# Patient Record
Sex: MALE | Race: OTHER | HISPANIC OR LATINO | Employment: OTHER | URBAN - METROPOLITAN AREA
[De-identification: names, ages, dates, MRNs, and addresses within clinical notes are randomized per-mention and may not be internally consistent; named-entity substitution may affect disease eponyms.]

---

## 2024-02-12 ENCOUNTER — HOSPITAL ENCOUNTER (EMERGENCY)
Facility: HOSPITAL | Age: 39
Discharge: HOME | End: 2024-02-12
Attending: EMERGENCY MEDICINE

## 2024-02-12 VITALS
TEMPERATURE: 98 F | HEART RATE: 54 BPM | HEIGHT: 71 IN | WEIGHT: 170 LBS | SYSTOLIC BLOOD PRESSURE: 145 MMHG | DIASTOLIC BLOOD PRESSURE: 86 MMHG | RESPIRATION RATE: 22 BRPM | OXYGEN SATURATION: 100 % | BODY MASS INDEX: 23.8 KG/M2

## 2024-02-12 DIAGNOSIS — J18.9 PNEUMONIA OF RIGHT LOWER LOBE DUE TO INFECTIOUS ORGANISM: Primary | ICD-10-CM

## 2024-02-12 DIAGNOSIS — R07.9 CHEST PAIN: ICD-10-CM

## 2024-02-12 LAB
ALBUMIN SERPL BCP-MCNC: 4.4 G/DL (ref 3.5–5.2)
ALP SERPL-CCNC: 71 U/L (ref 55–135)
ALT SERPL W/O P-5'-P-CCNC: 12 U/L (ref 10–44)
ANION GAP SERPL CALC-SCNC: 8 MMOL/L (ref 8–16)
AST SERPL-CCNC: 18 U/L (ref 10–40)
BASOPHILS # BLD AUTO: 0.04 K/UL (ref 0–0.2)
BASOPHILS NFR BLD: 0.6 % (ref 0–1.9)
BILIRUB SERPL-MCNC: 0.4 MG/DL (ref 0.1–1)
BNP SERPL-MCNC: <10 PG/ML (ref 0–99)
BUN SERPL-MCNC: 14 MG/DL (ref 6–20)
CALCIUM SERPL-MCNC: 8.9 MG/DL (ref 8.7–10.5)
CHLORIDE SERPL-SCNC: 106 MMOL/L (ref 95–110)
CO2 SERPL-SCNC: 25 MMOL/L (ref 23–29)
CREAT SERPL-MCNC: 1 MG/DL (ref 0.5–1.4)
CTP QC/QA: YES
CTP QC/QA: YES
DIFFERENTIAL METHOD BLD: ABNORMAL
EOSINOPHIL # BLD AUTO: 0.1 K/UL (ref 0–0.5)
EOSINOPHIL NFR BLD: 2 % (ref 0–8)
ERYTHROCYTE [DISTWIDTH] IN BLOOD BY AUTOMATED COUNT: 12.4 % (ref 11.5–14.5)
EST. GFR  (NO RACE VARIABLE): >60 ML/MIN/1.73 M^2
GLUCOSE SERPL-MCNC: 100 MG/DL (ref 70–110)
HCT VFR BLD AUTO: 43.9 % (ref 40–54)
HGB BLD-MCNC: 14.5 G/DL (ref 14–18)
IMM GRANULOCYTES # BLD AUTO: 0.01 K/UL (ref 0–0.04)
IMM GRANULOCYTES NFR BLD AUTO: 0.2 % (ref 0–0.5)
LYMPHOCYTES # BLD AUTO: 2.9 K/UL (ref 1–4.8)
LYMPHOCYTES NFR BLD: 43.7 % (ref 18–48)
MCH RBC QN AUTO: 30.8 PG (ref 27–31)
MCHC RBC AUTO-ENTMCNC: 33 G/DL (ref 32–36)
MCV RBC AUTO: 93 FL (ref 82–98)
MONOCYTES # BLD AUTO: 0.7 K/UL (ref 0.3–1)
MONOCYTES NFR BLD: 10.4 % (ref 4–15)
NEUTROPHILS # BLD AUTO: 2.9 K/UL (ref 1.8–7.7)
NEUTROPHILS NFR BLD: 43.1 % (ref 38–73)
NRBC BLD-RTO: 0 /100 WBC
PLATELET # BLD AUTO: 151 K/UL (ref 150–450)
PMV BLD AUTO: 13 FL (ref 9.2–12.9)
POC MOLECULAR INFLUENZA A AGN: NEGATIVE
POC MOLECULAR INFLUENZA B AGN: NEGATIVE
POTASSIUM SERPL-SCNC: 3.8 MMOL/L (ref 3.5–5.1)
PROT SERPL-MCNC: 7.7 G/DL (ref 6–8.4)
RBC # BLD AUTO: 4.71 M/UL (ref 4.6–6.2)
SARS-COV-2 RDRP RESP QL NAA+PROBE: NEGATIVE
SODIUM SERPL-SCNC: 139 MMOL/L (ref 136–145)
TROPONIN I SERPL DL<=0.01 NG/ML-MCNC: <0.006 NG/ML (ref 0–0.03)
WBC # BLD AUTO: 6.61 K/UL (ref 3.9–12.7)

## 2024-02-12 PROCEDURE — 99285 EMERGENCY DEPT VISIT HI MDM: CPT | Mod: 25

## 2024-02-12 PROCEDURE — 83880 ASSAY OF NATRIURETIC PEPTIDE: CPT | Performed by: PHYSICIAN ASSISTANT

## 2024-02-12 PROCEDURE — 85025 COMPLETE CBC W/AUTO DIFF WBC: CPT | Performed by: PHYSICIAN ASSISTANT

## 2024-02-12 PROCEDURE — 87502 INFLUENZA DNA AMP PROBE: CPT

## 2024-02-12 PROCEDURE — 84484 ASSAY OF TROPONIN QUANT: CPT | Performed by: PHYSICIAN ASSISTANT

## 2024-02-12 PROCEDURE — 87635 SARS-COV-2 COVID-19 AMP PRB: CPT | Performed by: PHYSICIAN ASSISTANT

## 2024-02-12 PROCEDURE — 80053 COMPREHEN METABOLIC PANEL: CPT | Performed by: PHYSICIAN ASSISTANT

## 2024-02-12 PROCEDURE — 25000003 PHARM REV CODE 250: Performed by: EMERGENCY MEDICINE

## 2024-02-12 PROCEDURE — 93005 ELECTROCARDIOGRAM TRACING: CPT

## 2024-02-12 PROCEDURE — 93010 ELECTROCARDIOGRAM REPORT: CPT | Mod: ,,, | Performed by: INTERNAL MEDICINE

## 2024-02-12 RX ORDER — AMOXICILLIN 500 MG/1
500 CAPSULE ORAL EVERY 12 HOURS
Qty: 14 CAPSULE | Refills: 0 | Status: SHIPPED | OUTPATIENT
Start: 2024-02-12 | End: 2024-02-19

## 2024-02-12 RX ORDER — IBUPROFEN 400 MG/1
800 TABLET ORAL
Status: COMPLETED | OUTPATIENT
Start: 2024-02-12 | End: 2024-02-12

## 2024-02-12 RX ORDER — AMOXICILLIN AND CLAVULANATE POTASSIUM 875; 125 MG/1; MG/1
1 TABLET, FILM COATED ORAL
Status: COMPLETED | OUTPATIENT
Start: 2024-02-12 | End: 2024-02-12

## 2024-02-12 RX ORDER — IBUPROFEN 800 MG/1
800 TABLET ORAL EVERY 6 HOURS PRN
Qty: 40 TABLET | Refills: 0 | Status: SHIPPED | OUTPATIENT
Start: 2024-02-12

## 2024-02-12 RX ADMIN — AMOXICILLIN AND CLAVULANATE POTASSIUM 1 TABLET: 875; 125 TABLET, FILM COATED ORAL at 09:02

## 2024-02-12 RX ADMIN — IBUPROFEN 800 MG: 400 TABLET ORAL at 09:02

## 2024-02-13 NOTE — DISCHARGE INSTRUCTIONS
Recommend ibuprofen as needed for pain control.    Seek medical care if your symptoms are worsening or any concerns.    1. Recomiende ibuprofeno según sea necesario para controlar el dolor.  2. Busque atención médica si elder síntomas empeoran o si tiene alguna inquietud.

## 2024-02-13 NOTE — ED PROVIDER NOTES
Encounter Date: 2/12/2024       History     Chief Complaint   Patient presents with    Chest Pain     Right side chest pain x 6 days with intermittent SOB x 3 days.      38-year-old male presents with right-sided chest pain.  No known health issues.  Patient states no tobacco use history.  Denies any alcohol use.  Denies any illicit drug use.  Symptom onset 6 days prior.  Patient reports constant right-sided chest pain that is intermittently worse.  Patient reports is now radiating to the right middle back.  Denies any pleuritic component.  Denies any shortness of breath.      Review of patient's allergies indicates:  No Known Allergies  No past medical history on file.  No past surgical history on file.  No family history on file.     Review of Systems   Constitutional:  Negative for chills, fatigue and fever.   HENT:  Negative for congestion and sore throat.    Respiratory:  Negative for cough, chest tightness and shortness of breath.    Cardiovascular:  Positive for chest pain. Negative for palpitations and leg swelling.   Gastrointestinal:  Negative for abdominal pain, diarrhea, nausea and vomiting.   Musculoskeletal:  Negative for back pain.   Skin:  Negative for rash.   Neurological:  Negative for dizziness, light-headedness and headaches.       Physical Exam     Initial Vitals [02/12/24 1720]   BP Pulse Resp Temp SpO2   (!) 142/88 61 16 98.1 °F (36.7 °C) 100 %      MAP       --         Physical Exam    Nursing note and vitals reviewed.  Constitutional: He appears well-developed. He is not diaphoretic. No distress.   Conversational sitting upright in chair.   HENT:   Head: Normocephalic.   Eyes: EOM are normal.   Neck: No JVD present.   Cardiovascular:  Normal rate and regular rhythm.           No murmur heard.  Pulmonary/Chest: Effort normal and breath sounds normal. He has no wheezes.   Abdominal: Abdomen is soft. He exhibits no distension. There is no abdominal tenderness.   Musculoskeletal:          General: Normal range of motion.      Comments: No lower extremity edema.  No calf tenderness     Neurological: He is alert.   Skin: Skin is warm.         ED Course   Procedures  Labs Reviewed   CBC W/ AUTO DIFFERENTIAL - Abnormal; Notable for the following components:       Result Value    MPV 13.0 (*)     All other components within normal limits   SARS-COV-2 RDRP GENE - Normal   POCT INFLUENZA A/B MOLECULAR - Normal   COMPREHENSIVE METABOLIC PANEL   TROPONIN I   B-TYPE NATRIURETIC PEPTIDE        ECG Results              EKG 12-LEAD (Final result)  Result time 02/13/24 10:38:22      Final result by Unknown User (02/13/24 10:38:22)                                      Imaging Results              X-Ray Chest PA And Lateral (Final result)  Result time 02/12/24 17:45:43      Final result by Ruben Taylor MD (02/12/24 17:45:43)                   Impression:      Focal airspace opacity in the right mid lung zone which could reflect infectious or inflammatory process, possible developing pneumonia in the right clinical setting.  Future radiographic follow-up is recommended to ensure resolution.      Electronically signed by: Ruben Taylor MD  Date:    02/12/2024  Time:    17:45               Narrative:    EXAMINATION:  XR CHEST PA AND LATERAL    CLINICAL HISTORY:  Shortness of breath    TECHNIQUE:  PA and lateral views of the chest were performed.    COMPARISON:  None    FINDINGS:  Cardiac silhouette is normal in size.  Lungs are symmetrically expanded.  Focal airspace opacity is seen in the right mid lung zone.  No confluent consolidation seen.  Lungs are otherwise clear.  No pneumothorax or significant pleural effusion..  No acute osseous abnormality identified.                                       Medications   ibuprofen tablet 800 mg (800 mg Oral Given 2/12/24 2110)   amoxicillin-clavulanate 875-125mg per tablet 1 tablet (1 tablet Oral Given 2/12/24 2110)     Medical Decision Making    38-year-old male  presenting with right-sided chest pain radiating into the right middle back.  He denies any pleuritic component, dyspnea, cough, nasal congestion.  The patient denies any history of blood clots.  Denies any surgeries within the last 4 months.  PERC negative.  X-ray shows possible right middle lobe infiltrate.  Patient non ill-appearing.  Patient's location of pain does correlate to the right lung infiltrate.  Patient was started on amoxicillin.  Patient reported response to ibuprofen.  Patient is to follow up with the primary care provider for re-evaluation within the next week.  Discussed return precautions for any worsening symptoms.      Differential diagnosis includes ACS, pulmonary edema, lower respiratory tract infection    Amount and/or Complexity of Data Reviewed  ECG/medicine tests:  Decision-making details documented in ED Course.    Risk  Prescription drug management.               ED Course as of 02/13/24 1838   Mon Feb 12, 2024 2003 EKG 12-lead  Time 5:18 p.m.     Rate 65, sinus, regular rhythm, normal axis.   QRS 92 QTC of 422.  No ST elevation or depression.  No T-wave inversion or hyperacute T-waves.  No Q-waves present.  PVC present.      Normal sinus rhythm with single PVC.   [JM]      ED Course User Index  [JM] Lv Jacinto MD                           Clinical Impression:  Final diagnoses:  [R07.9] Chest pain  [J18.9] Pneumonia of right lower lobe due to infectious organism (Primary)          ED Disposition Condition    Discharge Stable          ED Prescriptions       Medication Sig Dispense Start Date End Date Auth. Provider    amoxicillin (AMOXIL) 500 MG capsule Take 1 capsule (500 mg total) by mouth every 12 (twelve) hours. for 7 days 14 capsule 2/12/2024 2/19/2024 Lv Jacinto MD    ibuprofen (ADVIL,MOTRIN) 800 MG tablet Take 1 tablet (800 mg total) by mouth every 6 (six) hours as needed for Pain. 40 tablet 2/12/2024 -- Lv Jacinto MD          Follow-up Information        Follow up With Specialties Details Why Contact Info    [facility]  Schedule an appointment as soon as possible for a visit in 1 week Primary care [address] [facility] [id number] [phone]    [facility] - Emergency Dept Emergency Medicine  If symptoms worsen [address] [facility] [place] [id number] [phone]             Lv Jacinto MD  02/13/24 4341

## 2024-02-14 LAB
OHS QRS DURATION: 92 MS
OHS QTC CALCULATION: 422 MS

## 2024-02-23 ENCOUNTER — HOSPITAL ENCOUNTER (EMERGENCY)
Facility: HOSPITAL | Age: 39
Discharge: HOME | End: 2024-02-23
Attending: INTERNAL MEDICINE

## 2024-02-23 VITALS
DIASTOLIC BLOOD PRESSURE: 86 MMHG | SYSTOLIC BLOOD PRESSURE: 136 MMHG | HEART RATE: 66 BPM | WEIGHT: 155 LBS | TEMPERATURE: 98 F | BODY MASS INDEX: 24.33 KG/M2 | HEIGHT: 67 IN | RESPIRATION RATE: 18 BRPM | OXYGEN SATURATION: 100 %

## 2024-02-23 DIAGNOSIS — J98.4 LUNG ABNORMALITY: Primary | ICD-10-CM

## 2024-02-23 DIAGNOSIS — R07.9 CHEST PAIN: ICD-10-CM

## 2024-02-23 LAB
ALBUMIN SERPL BCP-MCNC: 4.5 G/DL (ref 3.5–5.2)
ALP SERPL-CCNC: 81 U/L (ref 55–135)
ALT SERPL W/O P-5'-P-CCNC: 20 U/L (ref 10–44)
ANION GAP SERPL CALC-SCNC: 9 MMOL/L (ref 8–16)
AST SERPL-CCNC: 23 U/L (ref 10–40)
BASOPHILS # BLD AUTO: 0.08 K/UL (ref 0–0.2)
BASOPHILS NFR BLD: 1 % (ref 0–1.9)
BILIRUB SERPL-MCNC: 0.4 MG/DL (ref 0.1–1)
BILIRUB UR QL STRIP: NEGATIVE
BNP SERPL-MCNC: <10 PG/ML (ref 0–99)
BUN SERPL-MCNC: 13 MG/DL (ref 6–20)
CALCIUM SERPL-MCNC: 9.4 MG/DL (ref 8.7–10.5)
CHLORIDE SERPL-SCNC: 105 MMOL/L (ref 95–110)
CLARITY UR: CLEAR
CO2 SERPL-SCNC: 24 MMOL/L (ref 23–29)
COLOR UR: YELLOW
CREAT SERPL-MCNC: 1.1 MG/DL (ref 0.5–1.4)
DIFFERENTIAL METHOD BLD: ABNORMAL
EOSINOPHIL # BLD AUTO: 0.2 K/UL (ref 0–0.5)
EOSINOPHIL NFR BLD: 2.9 % (ref 0–8)
ERYTHROCYTE [DISTWIDTH] IN BLOOD BY AUTOMATED COUNT: 12.9 % (ref 11.5–14.5)
EST. GFR  (NO RACE VARIABLE): >60 ML/MIN/1.73 M^2
GLUCOSE SERPL-MCNC: 92 MG/DL (ref 70–110)
GLUCOSE UR QL STRIP: NEGATIVE
HCT VFR BLD AUTO: 46.4 % (ref 40–54)
HGB BLD-MCNC: 15.6 G/DL (ref 14–18)
HGB UR QL STRIP: ABNORMAL
IMM GRANULOCYTES # BLD AUTO: 0.02 K/UL (ref 0–0.04)
IMM GRANULOCYTES NFR BLD AUTO: 0.3 % (ref 0–0.5)
KETONES UR QL STRIP: NEGATIVE
LACTATE SERPL-SCNC: 0.7 MMOL/L (ref 0.5–2.2)
LEUKOCYTE ESTERASE UR QL STRIP: NEGATIVE
LYMPHOCYTES # BLD AUTO: 3.3 K/UL (ref 1–4.8)
LYMPHOCYTES NFR BLD: 42.6 % (ref 18–48)
MCH RBC QN AUTO: 31.1 PG (ref 27–31)
MCHC RBC AUTO-ENTMCNC: 33.6 G/DL (ref 32–36)
MCV RBC AUTO: 93 FL (ref 82–98)
MONOCYTES # BLD AUTO: 0.7 K/UL (ref 0.3–1)
MONOCYTES NFR BLD: 8.7 % (ref 4–15)
NEUTROPHILS # BLD AUTO: 3.4 K/UL (ref 1.8–7.7)
NEUTROPHILS NFR BLD: 44.5 % (ref 38–73)
NITRITE UR QL STRIP: NEGATIVE
NRBC BLD-RTO: 0 /100 WBC
PH UR STRIP: 6 [PH] (ref 5–8)
PLATELET # BLD AUTO: 150 K/UL (ref 150–450)
PMV BLD AUTO: 13.3 FL (ref 9.2–12.9)
POTASSIUM SERPL-SCNC: 4.1 MMOL/L (ref 3.5–5.1)
PROCALCITONIN SERPL IA-MCNC: 0.03 NG/ML
PROT SERPL-MCNC: 8.2 G/DL (ref 6–8.4)
PROT UR QL STRIP: NEGATIVE
RBC # BLD AUTO: 5.01 M/UL (ref 4.6–6.2)
SODIUM SERPL-SCNC: 138 MMOL/L (ref 136–145)
SP GR UR STRIP: 1.02 (ref 1–1.03)
TROPONIN I SERPL DL<=0.01 NG/ML-MCNC: 0.01 NG/ML (ref 0–0.03)
URN SPEC COLLECT METH UR: ABNORMAL
UROBILINOGEN UR STRIP-ACNC: NEGATIVE EU/DL
WBC # BLD AUTO: 7.7 K/UL (ref 3.9–12.7)

## 2024-02-23 PROCEDURE — 83880 ASSAY OF NATRIURETIC PEPTIDE: CPT | Performed by: NURSE PRACTITIONER

## 2024-02-23 PROCEDURE — 96374 THER/PROPH/DIAG INJ IV PUSH: CPT

## 2024-02-23 PROCEDURE — 83605 ASSAY OF LACTIC ACID: CPT | Performed by: NURSE PRACTITIONER

## 2024-02-23 PROCEDURE — 93005 ELECTROCARDIOGRAM TRACING: CPT

## 2024-02-23 PROCEDURE — 63600175 PHARM REV CODE 636 W HCPCS: Performed by: INTERNAL MEDICINE

## 2024-02-23 PROCEDURE — 80053 COMPREHEN METABOLIC PANEL: CPT | Performed by: NURSE PRACTITIONER

## 2024-02-23 PROCEDURE — 81003 URINALYSIS AUTO W/O SCOPE: CPT | Performed by: NURSE PRACTITIONER

## 2024-02-23 PROCEDURE — 85025 COMPLETE CBC W/AUTO DIFF WBC: CPT | Performed by: NURSE PRACTITIONER

## 2024-02-23 PROCEDURE — 87040 BLOOD CULTURE FOR BACTERIA: CPT | Performed by: NURSE PRACTITIONER

## 2024-02-23 PROCEDURE — 99285 EMERGENCY DEPT VISIT HI MDM: CPT | Mod: 25

## 2024-02-23 PROCEDURE — 84145 PROCALCITONIN (PCT): CPT | Performed by: NURSE PRACTITIONER

## 2024-02-23 PROCEDURE — 93010 ELECTROCARDIOGRAM REPORT: CPT | Mod: ,,, | Performed by: INTERNAL MEDICINE

## 2024-02-23 PROCEDURE — 84484 ASSAY OF TROPONIN QUANT: CPT | Performed by: NURSE PRACTITIONER

## 2024-02-23 RX ORDER — KETOROLAC TROMETHAMINE 30 MG/ML
30 INJECTION, SOLUTION INTRAMUSCULAR; INTRAVENOUS
Status: COMPLETED | OUTPATIENT
Start: 2024-02-23 | End: 2024-02-23

## 2024-02-23 RX ORDER — HYDROCODONE BITARTRATE AND ACETAMINOPHEN 5; 325 MG/1; MG/1
1 TABLET ORAL EVERY 6 HOURS PRN
Qty: 10 TABLET | Refills: 0 | Status: SHIPPED | OUTPATIENT
Start: 2024-02-23

## 2024-02-23 RX ADMIN — KETOROLAC TROMETHAMINE 30 MG: 30 INJECTION, SOLUTION INTRAMUSCULAR; INTRAVENOUS at 10:02

## 2024-02-23 NOTE — FIRST PROVIDER EVALUATION
"Medical screening examination initiated.  I have conducted a focused provider triage encounter, findings are as follows:    Brief history of present illness:  Seen last week for CP, diagnosed with pneumonia; completed Amoxicillin and states pain continues    Vitals:    02/23/24 1729   BP: (!) 139/91   BP Location: Right arm   Patient Position: Sitting   Pulse: 68   Resp: 20   Temp: 97.8 °F (36.6 °C)   TempSrc: Oral   SpO2: 100%   Weight: 70.3 kg (155 lb)   Height: 5' 7" (1.702 m)       Pertinent physical exam:  NAD    Brief workup plan:  labs, CXR    Preliminary workup initiated; this workup will be continued and followed by the physician or advanced practice provider that is assigned to the patient when roomed.  "

## 2024-02-24 NOTE — ED TRIAGE NOTES
Pt presents to ED for the evaluation of right sided chest pain ongoing for 4 days. Patient was seen in the Ed for similar symptoms 15 days ago. Pt reports being diagnosed with pneumonia, states pain keeps coming back and has worsened this time. Denies other symptoms

## 2024-02-24 NOTE — DISCHARGE INSTRUCTIONS
seguimiento con la [facility] según lo programado y con norris médico de atención primaria dentro de la próxima semana para glenny reevaluación.

## 2024-02-24 NOTE — ED PROVIDER NOTES
"Encounter Date: 2/23/2024    SCRIBE #1 NOTE: I, Rikki Barcenas, am scribing for, and in the presence of,  Jose Rico MD.       History     Chief Complaint   Patient presents with    Pneumonia     Pt recently diagnosed with Pneumonia and completed dose of abx. Pt back to ER with reports "I'm not feeling any better". Pt denies SOB, weakness. + mid back discomfort " same as before".     Jack Wright is a 38 y.o. male with no known PMHx, who presents to the ED for evaluation of right sided chest pain for 4 days. Patient reports she was seen in the ED approximately 15 days ago for similar complaints, noting he was prescribed some medication and was diagnosed with pneumonia. He states he has been taking the prescribed medication with mild temporary relief noted, noting pain returns usually after 4-5 hours. He reports his pain has worsened in the past 4 days, noting his pain now radiates to his right shoulder/right upper back. He endorses last taking his medications this morning. No other medications taken PTA. No alleviating or exacerbating factors noted. Denies dyspnea, N/V, fever, chills, or other associated symptoms. Denies EtOH, tobacco, or other illicit drug usage. NKDA.     The history is provided by the patient. No  was used.     Review of patient's allergies indicates:  No Known Allergies  No past medical history on file.  No past surgical history on file.  No family history on file.     Review of Systems   Constitutional:  Negative for fever.   HENT:  Negative for sore throat.    Respiratory:  Negative for shortness of breath.    Cardiovascular:  Positive for chest pain.   Gastrointestinal:  Negative for diarrhea, nausea and vomiting.   Genitourinary:  Negative for dysuria.   Musculoskeletal:  Positive for arthralgias. Negative for back pain.   Skin:  Negative for rash.   Neurological:  Negative for weakness and headaches.   Psychiatric/Behavioral:  Negative for behavioral " problems.    All other systems reviewed and are negative.      Physical Exam     Initial Vitals [02/23/24 1729]   BP Pulse Resp Temp SpO2   (!) 139/91 68 20 97.8 °F (36.6 °C) 100 %      MAP       --         Physical Exam    Nursing note and vitals reviewed.  Constitutional: He appears well-developed and well-nourished.   HENT:   Head: Normocephalic and atraumatic.   Eyes: Conjunctivae are normal.   Neck: Neck supple.   Normal range of motion.  Cardiovascular:  Normal rate, regular rhythm and normal heart sounds.     Exam reveals no gallop and no friction rub.       No murmur heard.  Pulmonary/Chest: Breath sounds normal. No respiratory distress. He has no wheezes. He has no rhonchi. He has no rales.   Abdominal: Abdomen is soft. There is no abdominal tenderness.   Musculoskeletal:         General: No edema. Normal range of motion.      Cervical back: Normal range of motion and neck supple.     Neurological: He is alert and oriented to person, place, and time. GCS score is 15.   Skin: Skin is warm and dry.   Psychiatric: He has a normal mood and affect.         ED Course   Procedures  Labs Reviewed   CBC W/ AUTO DIFFERENTIAL - Abnormal; Notable for the following components:       Result Value    MCH 31.1 (*)     MPV 13.3 (*)     All other components within normal limits   URINALYSIS, REFLEX TO URINE CULTURE - Abnormal; Notable for the following components:    Occult Blood UA Trace (*)     All other components within normal limits    Narrative:     Specimen Source->Urine   CULTURE, BLOOD   CULTURE, BLOOD   COMPREHENSIVE METABOLIC PANEL   TROPONIN I   B-TYPE NATRIURETIC PEPTIDE   PROCALCITONIN   LACTIC ACID, PLASMA          Imaging Results              CT Chest Without Contrast (Final result)  Result time 02/23/24 22:16:36      Final result by Ruben Taylor MD (02/23/24 22:16:36)                   Impression:      Focal 1.2 cm nodular opacity or pulmonary nodule within the right upper lobe with mild ground-glass  halo.  This could represent possible focal infectious or inflammatory process.  Future CT follow-up is recommended in 3 months to assess for resolution, as this finding was not clearly apparent on earlier chest radiograph.  Pulmonary medicine consultation or referral is suggested.      Electronically signed by: Ruben Taylor MD  Date:    02/23/2024  Time:    22:16               Narrative:    EXAMINATION:  CT CHEST WITHOUT CONTRAST    CLINICAL HISTORY:  Pneumonia, unresolved;    TECHNIQUE:  Low dose axial images, sagittal and coronal reformations were obtained from the thoracic inlet to the lung bases. Contrast was not administered.    COMPARISON:  None.    FINDINGS:  Structures at the base of the neck are unremarkable.  Aorta is non-aneurysmal.  The heart is normal in size without pericardial effusion.  There is no evidence of mediastinal, axillary, or hilar lymph node enlargement.  The esophagus is unremarkable along its course.    The trachea and bronchi are patent.  The lungs are symmetrically expanded.  Focal nodular opacity is seen in the right upper lobe measuring 1.2 cm with mild ground-glass halo.  Additional small right upper lobe 3 mm micronodule is visualized.  Lungs show no additional focal consolidation.  No pleural effusion.    The visualized abdominal structures are unremarkable.  No acute osseous abnormality identified.  Extrathoracic soft tissues are unremarkable.                                       X-Ray Chest PA And Lateral (Final result)  Result time 02/23/24 18:30:08      Final result by Ruben Taylor MD (02/23/24 18:30:08)                   Impression:      See above.      Electronically signed by: Ruben Taylor MD  Date:    02/23/2024  Time:    18:30               Narrative:    EXAMINATION:  XR CHEST PA AND LATERAL    CLINICAL HISTORY:  chest pain;    TECHNIQUE:  PA and lateral views of the chest were performed.    COMPARISON:  02/12/2024.    FINDINGS:  Cardiac silhouette is normal in  size.  Lungs are symmetrically expanded.  Previously visualized subtle opacity in the right mid lung zone appears less conspicuous.  There is however a small rounded focal lucency seen in this same region.  Potential small cavitation to be considered.  Otherwise no evidence of new focal consolidation, pneumothorax, or significant pleural effusion.  No acute osseous abnormality identified.                                       Medications   ketorolac injection 30 mg (30 mg Intravenous Given 2/23/24 7315)     Medical Decision Making  Jack Wright is a 38 y.o. male with no known PMHx, who presents to the ED for evaluation of right sided chest pain for 4 days. Patient reports she was seen in the ED approximately 15 days ago for similar complaints, noting he was prescribed some medication and was diagnosed with pneumonia. He states he has been taking the prescribed medication with mild temporary relief noted, noting pain returns usually after 4-5 hours. He reports his pain has worsened in the past 4 days, noting his pain now radiates to his right shoulder/right upper back. He endorses last taking his medications this morning. No other medications taken PTA. No alleviating or exacerbating factors noted. Denies dyspnea, N/V, fever, chills, or other associated symptoms. Denies EtOH, tobacco, or other illicit drug usage. NKDA.  Course of ED stay:   EKG showed no acute abnormalities, troponin was normal, BNP was normal, CBC and CMP are reassuring, and serum lactate is normal.  CT chest shows 1.2 cm nodular opacity to the right upper lobe consistent with pulmonary nodule versus infectious or inflammatory process.  Patient has exhibited no signs of infection and recently finished a course of antibiotics.  Referral to Pulmonary Clinic was given as well as a prescription for p.r.n. Norco back pain.  Patient was advised to follow with Pulmonary Clinic as scheduled, with his PCP within the next week for re-evaluation and  given strict instructions to return to the emergency department if condition worsens.    Amount and/or Complexity of Data Reviewed  Labs: ordered.  Radiology: ordered.  ECG/medicine tests: ordered and independent interpretation performed.     Details: EKGs independently interpreted by Jose Rico MD reads: see prior notes.      Risk  Prescription drug management.            Scribe Attestation:   Scribe #1: I performed the above scribed service and the documentation accurately describes the services I performed. I attest to the accuracy of the note.                             This document was produced by a scribe under my direction and in my presence. I agree with the content of the note and have made any necessary edits.     Dr. Rico    02/24/2024 2:57 AM      Clinical Impression:  Final diagnoses:  [R07.9] Chest pain  [J98.4] Lung abnormality (Primary)          ED Disposition Condition    Discharge Stable          ED Prescriptions       Medication Sig Dispense Start Date End Date Auth. Provider    HYDROcodone-acetaminophen (NORCO) 5-325 mg per tablet Take 1 tablet by mouth every 6 (six) hours as needed for Pain. 10 tablet 2/23/2024 -- Jose Rico MD          Follow-up Information    None          Jose Rico MD  02/24/24 0258

## 2024-02-25 LAB
OHS QRS DURATION: 84 MS
OHS QTC CALCULATION: 397 MS

## 2024-02-27 LAB
BACTERIA BLD CULT: NORMAL
BACTERIA BLD CULT: NORMAL

## 2024-03-29 ENCOUNTER — HOSPITAL ENCOUNTER (EMERGENCY)
Facility: HOSPITAL | Age: 39
Discharge: HOME | End: 2024-03-29
Attending: EMERGENCY MEDICINE

## 2024-03-29 VITALS
RESPIRATION RATE: 18 BRPM | OXYGEN SATURATION: 100 % | HEIGHT: 68 IN | SYSTOLIC BLOOD PRESSURE: 111 MMHG | WEIGHT: 167 LBS | BODY MASS INDEX: 25.31 KG/M2 | DIASTOLIC BLOOD PRESSURE: 69 MMHG | HEART RATE: 64 BPM | TEMPERATURE: 98 F

## 2024-03-29 DIAGNOSIS — R07.9 CHEST PAIN: ICD-10-CM

## 2024-03-29 DIAGNOSIS — M94.0 COSTOCHONDRITIS: Primary | ICD-10-CM

## 2024-03-29 DIAGNOSIS — R91.1 LUNG NODULE: ICD-10-CM

## 2024-03-29 LAB
ALBUMIN SERPL BCP-MCNC: 4.3 G/DL (ref 3.5–5.2)
ALP SERPL-CCNC: 73 U/L (ref 55–135)
ALT SERPL W/O P-5'-P-CCNC: 13 U/L (ref 10–44)
ANION GAP SERPL CALC-SCNC: 9 MMOL/L (ref 8–16)
AST SERPL-CCNC: 19 U/L (ref 10–40)
BASOPHILS # BLD AUTO: 0.06 K/UL (ref 0–0.2)
BASOPHILS NFR BLD: 1.1 % (ref 0–1.9)
BILIRUB SERPL-MCNC: 0.6 MG/DL (ref 0.1–1)
BNP SERPL-MCNC: 11 PG/ML (ref 0–99)
BUN SERPL-MCNC: 11 MG/DL (ref 6–20)
CALCIUM SERPL-MCNC: 9.6 MG/DL (ref 8.7–10.5)
CHLORIDE SERPL-SCNC: 106 MMOL/L (ref 95–110)
CO2 SERPL-SCNC: 26 MMOL/L (ref 23–29)
CREAT SERPL-MCNC: 1 MG/DL (ref 0.5–1.4)
CTP QC/QA: YES
CTP QC/QA: YES
DIFFERENTIAL METHOD BLD: ABNORMAL
EOSINOPHIL # BLD AUTO: 0.1 K/UL (ref 0–0.5)
EOSINOPHIL NFR BLD: 1.3 % (ref 0–8)
ERYTHROCYTE [DISTWIDTH] IN BLOOD BY AUTOMATED COUNT: 12.7 % (ref 11.5–14.5)
EST. GFR  (NO RACE VARIABLE): >60 ML/MIN/1.73 M^2
GLUCOSE SERPL-MCNC: 87 MG/DL (ref 70–110)
HCT VFR BLD AUTO: 47.8 % (ref 40–54)
HGB BLD-MCNC: 15.8 G/DL (ref 14–18)
IMM GRANULOCYTES # BLD AUTO: 0.01 K/UL (ref 0–0.04)
IMM GRANULOCYTES NFR BLD AUTO: 0.2 % (ref 0–0.5)
LYMPHOCYTES # BLD AUTO: 1.7 K/UL (ref 1–4.8)
LYMPHOCYTES NFR BLD: 30.6 % (ref 18–48)
MCH RBC QN AUTO: 30.7 PG (ref 27–31)
MCHC RBC AUTO-ENTMCNC: 33.1 G/DL (ref 32–36)
MCV RBC AUTO: 93 FL (ref 82–98)
MONOCYTES # BLD AUTO: 0.5 K/UL (ref 0.3–1)
MONOCYTES NFR BLD: 8.7 % (ref 4–15)
NEUTROPHILS # BLD AUTO: 3.1 K/UL (ref 1.8–7.7)
NEUTROPHILS NFR BLD: 58.1 % (ref 38–73)
NRBC BLD-RTO: 0 /100 WBC
PLATELET # BLD AUTO: 144 K/UL (ref 150–450)
PMV BLD AUTO: 13.7 FL (ref 9.2–12.9)
POC MOLECULAR INFLUENZA A AGN: NEGATIVE
POC MOLECULAR INFLUENZA B AGN: NEGATIVE
POTASSIUM SERPL-SCNC: 4 MMOL/L (ref 3.5–5.1)
PROT SERPL-MCNC: 7.9 G/DL (ref 6–8.4)
RBC # BLD AUTO: 5.15 M/UL (ref 4.6–6.2)
SARS-COV-2 RDRP RESP QL NAA+PROBE: NEGATIVE
SODIUM SERPL-SCNC: 141 MMOL/L (ref 136–145)
TROPONIN I SERPL DL<=0.01 NG/ML-MCNC: <0.006 NG/ML (ref 0–0.03)
WBC # BLD AUTO: 5.39 K/UL (ref 3.9–12.7)

## 2024-03-29 PROCEDURE — 99284 EMERGENCY DEPT VISIT MOD MDM: CPT | Mod: 25

## 2024-03-29 PROCEDURE — 87502 INFLUENZA DNA AMP PROBE: CPT

## 2024-03-29 PROCEDURE — 84484 ASSAY OF TROPONIN QUANT: CPT

## 2024-03-29 PROCEDURE — 83880 ASSAY OF NATRIURETIC PEPTIDE: CPT

## 2024-03-29 PROCEDURE — 85025 COMPLETE CBC W/AUTO DIFF WBC: CPT

## 2024-03-29 PROCEDURE — 87635 SARS-COV-2 COVID-19 AMP PRB: CPT

## 2024-03-29 PROCEDURE — 80053 COMPREHEN METABOLIC PANEL: CPT

## 2024-03-29 RX ORDER — NAPROXEN 500 MG/1
500 TABLET ORAL 2 TIMES DAILY
Qty: 30 TABLET | Refills: 0 | Status: SHIPPED | OUTPATIENT
Start: 2024-03-29

## 2024-03-29 RX ORDER — GUAIFENESIN AND DEXTROMETHORPHAN HYDROBROMIDE 10; 100 MG/5ML; MG/5ML
5 SYRUP ORAL EVERY 6 HOURS
Qty: 473 ML | Refills: 0 | Status: SHIPPED | OUTPATIENT
Start: 2024-03-29 | End: 2024-04-08

## 2024-03-29 RX ORDER — ACETAMINOPHEN 500 MG
500 TABLET ORAL EVERY 6 HOURS PRN
Qty: 30 TABLET | Refills: 0 | Status: SHIPPED | OUTPATIENT
Start: 2024-03-29

## 2024-03-29 NOTE — DISCHARGE INSTRUCTIONS
Thank you for coming to our Emergency Department today. It is important to remember that some problems or medical conditions are difficult to diagnose and may not be found or addressed during your Emergency Department visit.  These conditions often start with non-specific symptoms and can only be diagnosed on follow up visits with your primary care physician or specialist when the symptoms continue or change. Please remember that all medical conditions can change, and we cannot predict how you will be feeling tomorrow or the next day. Return to the ER with any questions/concerns, new/concerning symptoms, worsening or failure to improve.   Be sure to follow up with your primary care doctor and review all labs/imaging/tests that were performed during your ER visit with them. It is very common for us to identify non-emergent incidental findings which must be followed up with your primary care physician.  Some labs/imaging/tests may be outside of the normal range, and require non-emergent follow-up and/or further investigation/treatment/procedures/testing to help diagnose/exclude/prevent complications or other potentially serious medical conditions. Some abnormalities may not have been discussed or addressed during your ER visit. Some lab results may not return during your ER visit but can be accessible by downloading the free [facility] or by visiting [email or web address] . It is important for you to review all labs/imaging/tests which are outside of the normal range with your physician.  An ER visit does not replace a primary care visit, and many screening tests or follow-up tests cannot be ordered by an ER doctor or performed by the ER. Some tests may even require pre-approval.  If you do not have a primary care doctor, you may contact the one listed on your discharge paperwork or you may also call the [facility] Appointment Desk at [phone] , or [address] [place] [address] at  [phone] to schedule an  appointment, or establish care with a primary care doctor or even a specialist and to obtain information about local resources. It is important to your health that you have a primary care doctor.  Please take all medications as directed. We have done our best to select a medication for you that will treat your condition however, all medications may potentially have side-effects and it is impossible to predict which medications may give you side-effects or what those side-effects (if any) those medications may give you.  If you feel that you are having a negative effect or side-effect of any medication you should stop taking those medications immediately and seek medical attention. If you feel that you are having a life-threatening reaction call 911.  Do not drive, swim, climb to height, take a bath, operate heavy machinery, drink alcohol or take potentially sedating medications, sign any legal documents or make any important decisions for 24 hours if you have received any pain medications, sedatives or mood altering drugs during your ER visit or within 24 hours of taking them if they have been prescribed to you.   You can find additional resources for Dentists, hearing aids, durable medical equipment, low cost pharmacies and other resources at [email or web address]  Patient agrees with this plan. Discussed with her strict return precautions, she verbalized understanding. Patient is stable for discharge.

## 2024-03-29 NOTE — ED PROVIDER NOTES
Encounter Date: 3/29/2024       History     Chief Complaint   Patient presents with    Chest Pain     Pt presents to ER with complaints of chest pain when he coughs. Pt states he was here 1 month ago and told he has a bump on his lung and he has never gotten the follow up appointment and he is here because he is concerned. Pt said he is not to concerned about the chest pain with the cough. Pt denies any other issues at this time.  Int [id number]     The history is provided by the patient and medical records. The history is limited by a language barrier. A  was used.   38-year-old male with no pertinent past medical history presenting to the emergency department today for the continuation of chest pain and cough as well as questions about a recent CT finding of a lung mass.  Patient was seen here on 02/12/24 was treated and seen for possible pneumonia with amoxicillin and has completed the course.  Patient was then re-evaluated on 02/23 undergone a CT chest with CT findings of a 1.2 cm pulmonary nodule with ground-glass halo concerning for possible focal infectious versus inflammatory process.  Patient was referred to pulmonology but states no one has called him to schedule an appointment.  Patient was presenting today because was unable to follow up with pulmonology in his concerned about these findings.  No new complaints at this time.  Patient still endorses same symptoms that is unchanged and not worsened of chest discomfort and cough.  Denies fever, shortness for breath, nausea, vomiting, diarrhea, abdominal pain, headache, dizziness, weakness.  Patient denies smoking history.  Review of patient's allergies indicates:  No Known Allergies  History reviewed. No pertinent past medical history.  History reviewed. No pertinent surgical history.  History reviewed. No pertinent family history.  Social History     Tobacco Use    Smoking status: Never    Smokeless tobacco: Never   Substance Use Topics     Alcohol use: Never    Drug use: Never     Review of Systems   Constitutional:  Negative for chills, diaphoresis, fatigue and fever.   HENT:  Positive for congestion. Negative for ear discharge, ear pain, rhinorrhea, sore throat and trouble swallowing.    Eyes:  Negative for photophobia, redness and visual disturbance.   Respiratory:  Positive for cough. Negative for shortness of breath.    Cardiovascular:  Positive for chest pain. Negative for palpitations and leg swelling.   Gastrointestinal:  Negative for abdominal pain, diarrhea, nausea and vomiting.   Genitourinary:  Negative for difficulty urinating, dysuria, flank pain, frequency and hematuria.   Musculoskeletal:  Negative for arthralgias, back pain, myalgias, neck pain and neck stiffness.   Skin:  Negative for pallor and rash.   Neurological:  Negative for dizziness, weakness, light-headedness and headaches.   Hematological:  Does not bruise/bleed easily.       Physical Exam     Initial Vitals [03/29/24 1156]   BP Pulse Resp Temp SpO2   122/71 80 18 98.4 °F (36.9 °C) --      MAP       --         Physical Exam    Nursing note and vitals reviewed.  Constitutional: He appears well-developed and well-nourished. He is not diaphoretic. He does not appear ill. No distress.   HENT:   Head: Normocephalic and atraumatic.   Right Ear: External ear normal.   Left Ear: External ear normal.   Nose: Nose normal.   Mouth/Throat: Uvula is midline and oropharynx is clear and moist.   Eyes: Conjunctivae and EOM are normal. Pupils are equal, round, and reactive to light. Right eye exhibits no discharge. Left eye exhibits no discharge. No scleral icterus.   Neck: Trachea normal. Neck supple. No JVD present.   Normal range of motion.   Full passive range of motion without pain.     Cardiovascular:  Normal rate, regular rhythm, normal heart sounds, intact distal pulses and normal pulses.     Exam reveals no distant heart sounds and no friction rub.       Pulmonary/Chest: Effort  normal and breath sounds normal. No accessory muscle usage. No tachypnea and no bradypnea. No respiratory distress. Chest wall is not dull to percussion. He exhibits tenderness. He exhibits no mass, no bony tenderness, no laceration, no crepitus, no edema, no deformity, no swelling and no retraction.   Abdominal: Abdomen is soft. Bowel sounds are normal. He exhibits no distension and no pulsatile midline mass. There is no abdominal tenderness.   No right CVA tenderness.  No left CVA tenderness. There is no rebound and no guarding.   Musculoskeletal:         General: Normal range of motion.      Right shoulder: Normal.      Left shoulder: Normal.      Right elbow: Normal.      Left elbow: Normal.      Right wrist: Normal.      Left wrist: Normal.      Right hand: Normal.      Left hand: Normal.      Cervical back: Normal, full passive range of motion without pain, normal range of motion and neck supple.      Thoracic back: Normal.      Lumbar back: Normal.      Right hip: Normal.      Left hip: Normal.      Right knee: Normal.      Left knee: Normal.      Right lower leg: Normal.      Left lower leg: Normal.      Right ankle: Normal.      Left ankle: Normal.      Right foot: Normal.      Left foot: Normal.     Neurological: He is alert and oriented to person, place, and time. He has normal strength. No cranial nerve deficit or sensory deficit. Coordination and gait normal.   Skin: Skin is warm and dry. Capillary refill takes less than 2 seconds. No bruising, no ecchymosis and no rash noted. No erythema.   Psychiatric: He has a normal mood and affect. His speech is normal and behavior is normal. Thought content normal.         ED Course   Procedures  Labs Reviewed   CBC W/ AUTO DIFFERENTIAL - Abnormal; Notable for the following components:       Result Value    Platelets 144 (*)     MPV 13.7 (*)     All other components within normal limits   COMPREHENSIVE METABOLIC PANEL   TROPONIN I   B-TYPE NATRIURETIC PEPTIDE    SARS-COV-2 RDRP GENE   POCT INFLUENZA A/B MOLECULAR     EKG Readings: (Independently Interpreted)   Initial Reading: No STEMI. Rhythm: Normal Sinus Rhythm. Ectopy: No Ectopy. Conduction: Normal. ST Segments: Normal ST Segments. T Waves: Normal. T Waves Flipped: III. Clinical Impression: Normal Sinus Rhythm   Normal sinus rhythm at a rate of 84.  AK interval 122.  .  Inverted T-waves in leads 3.  No ST elevation.  Seen by Dr. Fletcher          Imaging Results              X-Ray Chest PA And Lateral (Final result)  Result time 03/29/24 12:47:47      Final result by Liban Gonzalez MD (03/29/24 12:47:47)                   Impression:      No acute chest disease identified.      Electronically signed by: Liban Gonzalez MD  Date:    03/29/2024  Time:    12:47               Narrative:    EXAMINATION:  XR CHEST PA AND LATERAL    CLINICAL HISTORY:  Chest pain, unspecified    TECHNIQUE:  PA and lateral views of the chest were performed.    COMPARISON:  02/23/2024.    FINDINGS:  The heart and mediastinal structures are unremarkable.  Pulmonary vasculature is within normal limits.  The lungs are well aerated and free of focal consolidations.  There is no evidence for pneumothorax or pleural effusions.  Bony structures are grossly intact.                                    X-Rays:   Independently Interpreted Readings:   Chest X-Ray: Normal heart size.  No infiltrates.  No acute abnormalities. No infiltrate. No effusion. No pneumothorax. Heart size normal.        Medications - No data to display  Medical Decision Making  38-year-old male with no pertinent past medical history presenting to the emergency department today for the continuation of chest pain and cough as well as questions about a recent CT finding of a lung mass.  Patient was seen here on 02/12/24 was treated and seen for possible pneumonia with amoxicillin and has completed the course.  Patient was then re-evaluated on 02/23 undergone a CT chest with CT  findings of a 1.2 cm pulmonary nodule with ground-glass halo concerning for possible focal infectious versus inflammatory process.  Patient was referred to pulmonology but states no one has called him to schedule an appointment.  Patient was presenting today because was unable to follow up with pulmonology in his concerned about these findings.  No new complaints at this time.  Patient still endorses same symptoms that is unchanged and not worsened of chest discomfort and cough.  Denies fever, shortness for breath, nausea, vomiting, diarrhea, abdominal pain, headache, dizziness, weakness.  Patient denies smoking history.  Patient's chart and medical history reviewed.  Patient's vitals reviewed.  They are afebrile, no respiratory distress, nontoxic-appearing in the ED.   Differential diagnosis is  - Dissection/AAA: unlikely due to stable patient, equal pulses UE/LE bilaterally, no abdominal pulsing mass.  - Tamponade: unlikely with no blunt chest trauma, normal heart sounds, no hypotension, no JVD.  - ACS: unlikely with normal Troponin, no STEMI on ECG, unremarkable CXR    - PE: unlikely with PERC negative  - Pneumonia/pneumothorax: considered with chest discomfort and CXR ordered for further evaluation. Clear lung sounds in all fields bilaterally, no egophony, no hyperresonance. CXR is unremarkable.  - Arrhythmia: unlikely with normal ECG and normal electrolytes, no PMH of arrhythmias.    - Musculoskeletal etiology/Pre-cordial catch syndrome/costochondritis: considered with reproducible pain with palpation, pain reproduced with movement, and patient with recent URI with intercostal tenderness reproduced with palpation.  Patient is currently hemodynamically stable and afebrile non concerning for sepsis other infectious etiology with no elevated white count to correlate.  No nodule could be appreciated on x-ray performed today although we continuation of symptoms may be missed on x-ray imaging and patient should still  follow up with a repeat CT scan in 1-2 months with pulmonology.  Due to patient's struggle with maintaining contact with pulmonology another referral is made and IM providing contact information with clinics that will take Medicaid patients for pulmonology.  I discussed the physical exam findings, and test results with patient and plan is made to have the patient follow up with their PCP in the next 2-3 days for re-evaluation. Patient will be discharged home with naproxen, Robitussin, Tylenol. Patient agrees with this plan and does not have any questions for me at this time. Patient is stable and afebrile.  I discussed with the patient/family the diagnosis, treatment plan, indications for return to the emergency department, and for expected follow-up. The patient/family verbalized an understanding. The patient/family is asked if there are any questions or concerns. We discuss the case, until all issues are addressed to the patient/family's satisfaction. Patient/family understands and is agreeable to the plan.   KIM WHITE    DISCLAIMER: This note was prepared with [employer] voice recognition transcription software. Garbled syntax, mangled pronouns, and other bizarre constructions may be attributed to that software system.        Amount and/or Complexity of Data Reviewed  External Data Reviewed: labs, radiology and notes.  Labs: ordered.  Radiology: ordered and independent interpretation performed. Decision-making details documented in ED Course.  ECG/medicine tests: ordered and independent interpretation performed.    Risk  OTC drugs.  Prescription drug management.                                      Clinical Impression:  Final diagnoses:  [R07.9] Chest pain  [R91.1] Lung nodule  [M94.0] Costochondritis (Primary)          ED Disposition Condition    Discharge Stable          ED Prescriptions       Medication Sig Dispense Start Date End Date Auth. Provider    acetaminophen (TYLENOL) 500 MG tablet Take 1 tablet (500  mg total) by mouth every 6 (six) hours as needed for Pain. 30 tablet 3/29/2024 -- Ganesh Manrique PA-C    naproxen (NAPROSYN) 500 MG tablet Take 1 tablet (500 mg total) by mouth 2 (two) times daily. 30 tablet 3/29/2024 -- Ganesh Manrique PA-C    dextromethorphan-guaiFENesin  mg/5 ml (ROBITUSSIN-DM)  mg/5 mL liquid Take 5 mLs by mouth every 6 (six) hours. for 10 days 473 mL 3/29/2024 4/8/2024 Ganesh Manrique PA-C          Follow-up Information       Follow up With Specialties Details Why Contact Info    Pulmonary, [facility]  Call  for follow up [address] [facility] [address]  [phone]      [facility]  Schedule an appointment as soon as possible for a visit in 3 days for follow up [address] [facility] [address]  [phone]               Ganesh Manrique PA-C  03/29/24 [id number]

## 2024-04-02 LAB
OHS QRS DURATION: 92 MS
OHS QTC CALCULATION: 432 MS